# Patient Record
Sex: FEMALE | URBAN - METROPOLITAN AREA
[De-identification: names, ages, dates, MRNs, and addresses within clinical notes are randomized per-mention and may not be internally consistent; named-entity substitution may affect disease eponyms.]

---

## 2021-10-25 ENCOUNTER — OFFICE VISIT (OUTPATIENT)
Dept: URBAN - METROPOLITAN AREA CLINIC 58 | Facility: CLINIC | Age: 51
End: 2021-10-25
Payer: COMMERCIAL

## 2021-10-25 DIAGNOSIS — H35.81 RETINAL COTTON WOOL SPOTS: ICD-10-CM

## 2021-10-25 DIAGNOSIS — H40.053 OCULAR HYPERTENSION, BILATERAL: ICD-10-CM

## 2021-10-25 DIAGNOSIS — H52.4 PRESBYOPIA: Primary | ICD-10-CM

## 2021-10-25 PROCEDURE — 92004 COMPRE OPH EXAM NEW PT 1/>: CPT | Performed by: OPTOMETRIST

## 2021-10-25 ASSESSMENT — VISUAL ACUITY
OD: 20/20
OS: 20/20

## 2021-10-25 ASSESSMENT — INTRAOCULAR PRESSURE
OD: 26
OS: 26

## 2021-10-25 NOTE — IMPRESSION/PLAN
Impression: Retinal cotton wool spots: H35.81. Plan: Discussed diagnosis in detail with patient. No treatment is required at this time. Will continue to observe condition and or symptoms. Call if [place] worsens. Only one seen OD. BP measured and is close to normal.  Pt just had normal physical.  Observe for worsening.

## 2021-10-25 NOTE — IMPRESSION/PLAN
Impression: Ocular hypertension, bilateral: H40.053. Plan: Diagnosis discussed in detail. No treatment required at this time. Continue to observe pt's optic nerve and IOP. Pt says she has had high IOP since childhood.

## 2021-12-02 ENCOUNTER — OFFICE VISIT (OUTPATIENT)
Dept: URBAN - METROPOLITAN AREA CLINIC 58 | Facility: CLINIC | Age: 51
End: 2021-12-02
Payer: MEDICARE

## 2021-12-02 PROCEDURE — 92133 CPTRZD OPH DX IMG PST SGM ON: CPT | Performed by: OPTOMETRIST

## 2021-12-02 PROCEDURE — 92014 COMPRE OPH EXAM EST PT 1/>: CPT | Performed by: OPTOMETRIST

## 2021-12-02 PROCEDURE — 76514 ECHO EXAM OF EYE THICKNESS: CPT | Performed by: OPTOMETRIST

## 2021-12-02 ASSESSMENT — INTRAOCULAR PRESSURE
OS: 22
OD: 22